# Patient Record
Sex: MALE | Race: WHITE | URBAN - NONMETROPOLITAN AREA
[De-identification: names, ages, dates, MRNs, and addresses within clinical notes are randomized per-mention and may not be internally consistent; named-entity substitution may affect disease eponyms.]

---

## 2018-05-23 ENCOUNTER — HOSPITAL ENCOUNTER (INPATIENT)
Facility: HOSPITAL | Age: 23
LOS: 6 days | Discharge: HOME | End: 2018-05-29
Attending: PSYCHIATRY & NEUROLOGY | Admitting: PSYCHIATRY & NEUROLOGY
Payer: MEDICAID

## 2018-05-23 ENCOUNTER — TRANSFERRED RECORDS (OUTPATIENT)
Dept: HEALTH INFORMATION MANAGEMENT | Facility: CLINIC | Age: 23
End: 2018-05-23

## 2018-05-23 PROBLEM — F29 PSYCHOSIS (H): Status: ACTIVE | Noted: 2018-05-23

## 2018-05-23 LAB
ALT SERPL-CCNC: 10 IU/L (ref 6–40)
AST SERPL-CCNC: 13 IU/L (ref 10–40)
CREAT SERPL-MCNC: 1.1 MG/DL (ref 0.7–1.2)
GLUCOSE SERPL-MCNC: 93 MG/DL (ref 70–100)
POTASSIUM SERPL-SCNC: 3.2 MMOL/L (ref 3.4–5.1)

## 2018-05-23 PROCEDURE — 12400000 ZZH R&B MH

## 2018-05-23 RX ORDER — OLANZAPINE 10 MG/1
10 TABLET ORAL 3 TIMES DAILY PRN
Status: DISCONTINUED | OUTPATIENT
Start: 2018-05-23 | End: 2018-05-29 | Stop reason: HOSPADM

## 2018-05-23 RX ORDER — OLANZAPINE 10 MG/2ML
10 INJECTION, POWDER, FOR SOLUTION INTRAMUSCULAR 3 TIMES DAILY PRN
Status: DISCONTINUED | OUTPATIENT
Start: 2018-05-23 | End: 2018-05-29 | Stop reason: HOSPADM

## 2018-05-23 RX ORDER — ACETAMINOPHEN 325 MG/1
650 TABLET ORAL EVERY 6 HOURS PRN
Status: DISCONTINUED | OUTPATIENT
Start: 2018-05-23 | End: 2018-05-29 | Stop reason: HOSPADM

## 2018-05-23 RX ORDER — ALUMINA, MAGNESIA, AND SIMETHICONE 2400; 2400; 240 MG/30ML; MG/30ML; MG/30ML
30 SUSPENSION ORAL 4 TIMES DAILY PRN
Status: DISCONTINUED | OUTPATIENT
Start: 2018-05-23 | End: 2018-05-29 | Stop reason: HOSPADM

## 2018-05-23 RX ORDER — HYDROXYZINE HYDROCHLORIDE 25 MG/1
25-50 TABLET, FILM COATED ORAL EVERY 4 HOURS PRN
Status: DISCONTINUED | OUTPATIENT
Start: 2018-05-23 | End: 2018-05-29 | Stop reason: HOSPADM

## 2018-05-23 ASSESSMENT — ACTIVITIES OF DAILY LIVING (ADL)
RETIRED_COMMUNICATION: 0-->UNDERSTANDS/COMMUNICATES WITHOUT DIFFICULTY
SWALLOWING: 0-->SWALLOWS FOODS/LIQUIDS WITHOUT DIFFICULTY
DRESS: SCRUBS (BEHAVIORAL HEALTH);INDEPENDENT
ORAL_HYGIENE: INDEPENDENT
BATHING: 0-->INDEPENDENT
DRESS: 0-->INDEPENDENT
LAUNDRY: UNABLE TO COMPLETE
GROOMING: INDEPENDENT
RETIRED_EATING: 0-->INDEPENDENT
TRANSFERRING: 0-->INDEPENDENT
COGNITION: 0 - NO COGNITION ISSUES REPORTED
FALL_HISTORY_WITHIN_LAST_SIX_MONTHS: NO
AMBULATION: 0-->INDEPENDENT
TOILETING: 0-->INDEPENDENT

## 2018-05-23 NOTE — IP AVS SNAPSHOT
MRN:[id number]                      After Visit Summary   5/23/2018    Miguel Velazquez    MRN: [id number]           Thank you!     Thank you for choosing [facility] for your care. Our goal is always to provide you with excellent care. Hearing back from our patients is one way we can continue to improve our services. Please take a few minutes to complete the written survey that you may receive in the mail after you visit with us. Thank you!        Patient Information     Date Of Birth          1995        Designated Caregiver       Most Recent Value    Caregiver    Will someone help with your care after discharge? no      About your hospital stay     You were admitted on:  May 23, 2018 You last received care in the:  [facility]    You were discharged on:  May 29, 2018       Who to Call     For medical emergencies, please call 911.  For non-urgent questions about your medical care, please call your primary care provider or clinic, None          Attending Provider     Provider Specialty    Franklin Arteaga MD Psychiatry    Nancie Luke NP Nurse Practitioner       Primary Care Provider Fax #    Physician No Ref-Primary [phone]      Further instructions from your care team       Behavioral Discharge Planning and Instructions    Summary: Patient was admitted with auditory hallucinations telling him to shoot himself in the mouth, head, or heart.     Main Diagnosis:  Bipolar disorder, current episode manic with psychotic features, R/o substance-induced psychosis, R/o schizoaffective disorder- bipolar type    Major Treatments, Procedures and Findings: Stabilize with medications, connect with community programs.    Symptoms to Report: feeling more aggressive, increased confusion, losing more sleep, mood getting worse or thoughts of suicide    Lifestyle Adjustment: Take all medications as prescribed, meet with doctor/ medication provider, out patient therapist, , and [facility]  worker as scheduled. Abstain from alcohol or any unprescribed drugs.    Psychiatry Follow-up:     [facility]  [address] [place] [address]  Phone - [phone]   Fax - [phone]      [facility]  [address]  [place], [id number]  [phone]  Fax [phone]    [facility]  [address]  [place]  [address]  Phone - [phone]  Fax - [phone]    [facility]   [address] [place]  [address]  Phone - [phone]  Fax - [phone]      Resources:   Crisis Intervention: 501.657.7882 or 208-212-1720 (TTY: 981.873.7606).  Call anytime for help.  National [place] on Mental Illness (www.[place].jessica.org): 451.821.7732 or 341-087-5832.  Suicide Awareness Voices of Education (SAVE) (www.save.org): 241-389-AKKC (1241)  National Suicide Prevention Line (www.mentalhealthmn.org): 194-921-DRHE (8925)  Mental Health Consumer/Survivor Network of [place] (www.mhcsn.net): 194.219.7474 or 490-141-1559  Mental Health Association of [place] (www.mentalhealth.org): 883.253.7156 or 772-627-3459    General Medication Instructions:   See your medication sheet(s) for instructions.   Take all medicines as directed.  Make no changes unless your doctor suggests them.   Go to all your doctor visits.  Be sure to have all your required lab tests. This way, your medicines can be refilled on time.  Do not use any drugs not prescribed by your doctor.  Avoid alcohol.    [place]:  [facility], Crisis stabilization housing- 879.959.8107  [facility] Crisis Line: 8-199-353-8991  Advocates For Family Peace: 766-3998  Sexual Assault Program [facility]: 156.463.9057 or 1-105.610.2515  [place] Battered Women's Program: 1-000-602-3069 Ext: 279       Calls answered Mon-Fri-8:00 am--4:30 pm    [place]:  Advocates for Family Peace: 1-161.234.7672  [facility] first call for help: 1-182.994.7903  [facility] Crisis Center:  (817) 728-8243      [place] Area:  Warm Line:  "6-792-104-7023       Calls answered Tuesday--Saturday 4:00 pm--10:00 pm  [facility] Crisis Line - 452.587.7020  [facility] 906-598-9088    [place] Statewide:  [place] Crisis and Referral Services: 1-284.814.9213  National Suicide Prevention Lifeline: 2-205-502-TALK (0553)   - vjg6fnro- Text  Life  to 250[address]  First Call for Help:   [facility] Helpline- 9-579-BZZG-HELP      Pending Results     No orders found from 2018 to 2018.            Statement of Approval     Ordered          18 1408  I have reviewed and agree with all the recommendations and orders detailed in this document.  EFFECTIVE NOW     Approved and electronically signed by:  Nancie Luke NP             Admission Information     Date & Time Provider Department Dept. Phone    2018 Nancie Luke NP [facility] [phone]      Your Vitals Were     Blood Pressure Pulse Temperature Respirations Height Weight    126/84 79 96.9  F (36.1  C) (Tympanic) 16 1.727 m (5' 8\") 88.3 kg (194 lb 11.2 oz)    Pulse Oximetry BMI (Body Mass Index)                100% 29.6 kg/m2          [employer] Information     [employer] lets you send messages to your doctor, view your test results, renew your prescriptions, schedule appointments and more. To sign up, go to [email or web address] . Click on \"Log in\" on the left side of the screen, which will take you to the Welcome page. Then click on \"Sign up Now\" on the right side of the page.     You will be asked to enter the access code listed below, as well as some personal information. Please follow the directions to create your username and password.     Your access code is: [email or web address]  Expires: 2018  2:13 PM     Your access code will  in 90 days. If you need help or a new code, please call your [facility] or [phone].        Care EveryWhere ID     This is your Care EveryWhere ID. This could be used by other organizations to access your [facility] medical " records  [id number]        Equal Access to Services     [facility] : Hadii aad ku haddeclansindy Gresham, warosa iselada zandra, qajose antoniomayra herrera, bhavin leon. So [facility] [phone].    ATENCIÓN: Si habla español, tiene a de santiago disposición servicios gratuitos de asistencia lingüística. Llame al [phone].    We comply with applicable federal civil rights laws and [place] laws. We do not discriminate on the basis of race, color, national origin, age, disability, sex, sexual orientation, or gender identity.               Review of your medicines      Notice     You have not been prescribed any medications.             Protect others around you: Learn how to safely use, store and throw away your medicines at www.disposemymeds.org.             Medication List: This is a list of all your medications and when to take them. Check marks below indicate your daily home schedule. Keep this list as a reference.      Notice     You have not been prescribed any medications.

## 2018-05-23 NOTE — PROGRESS NOTES
05/23/18 1726   Patient Belongings   Did you bring any home meds/supplements to the hospital?  No   Patient Belongings cell phone/electronics;clothing;shoes   Disposition of Belongings Sent to security per site process;Other (see comment)   Belongings Search Yes   Clothing Search Yes   Second Staff Stefan   General Info Comment 1 pair of black pajama pants, 1 black t-shirt, 1 pair of brown slippers all put in pt. cubby in pt belongings room. 1 black cell phone (NO cracks in the screen) sent to the safe.     List items sent to safe: 1 black cell phone (no cracks in the screen) sent to the safe.   All other belongings put in assigned cubby in belongings room.     I have reviewed my belongings list on admission and verify that it is correct.     Patient signature_______________________________    Second staff witness (if patient unable to sign) ______________________________       I have received all my belongings at discharge.    Patient signature________________________________    Hermila   5/23/2018  5:28 PM

## 2018-05-23 NOTE — IP AVS SNAPSHOT
[facility]    [address] [place] [address]    Phone:  [phone]    Fax:  [phone]                                       After Visit Summary   5/23/2018    Miguel Velazquez    MRN: [id number]           After Visit Summary Signature Page     I have received my discharge instructions, and my questions have been answered. I have discussed any challenges I see with this plan with the nurse or doctor.    ..........................................................................................................................................  Patient/Patient Representative Signature      ..........................................................................................................................................  Patient Representative Print Name and Relationship to Patient    ..................................................               ................................................  Date                                            Time    ..........................................................................................................................................  Reviewed by Signature/Title    ...................................................              ..............................................  Date                                                            Time

## 2018-05-23 NOTE — PLAN OF CARE
"ADMISSION NOTE    Reason for admission Psychosis command hallucinations to kill self.  Safety concerns No.  Risk for or history of violence NO.   Full skin assessment: yes and no issues    Patient arrived on unit from [facility] accompanied by Security company on 5/23/2018  4335.   Status on arrival: Calm and cooperative.  Pt somewhat sedated from medication given in ER before transport  /75  Pulse 94  Temp 98.7  F (37.1  C) (Tympanic)  Resp 16  Ht 1.727 m (5' 8\")  Wt 84.1 kg (185 lb 4.8 oz)  SpO2 95%  BMI 28.17 kg/m2  Patient given tour of unit and Welcome to  unit papers given to patient, wanding completed, belongings inventoried, and admission assessment completed.   Patient's legal status on arrival is 72 hour hold. Appropriate legal rights discussed with and copy given to patient. Patient Bill of Rights discussed with and copy given to patient.   Patient denies SI, HI, and thoughts of self harm and contracts for safety while on unit.      Bello Whitlock  5/23/2018  1544          "
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
## 2018-05-24 PROCEDURE — 99223 1ST HOSP IP/OBS HIGH 75: CPT | Performed by: NURSE PRACTITIONER

## 2018-05-24 PROCEDURE — 12400000 ZZH R&B MH

## 2018-05-24 PROCEDURE — 25000132 ZZH RX MED GY IP 250 OP 250 PS 637: Performed by: NURSE PRACTITIONER

## 2018-05-24 RX ADMIN — OLANZAPINE 10 MG: 10 TABLET, FILM COATED ORAL at 20:27

## 2018-05-24 RX ADMIN — HYDROXYZINE HYDROCHLORIDE 50 MG: 25 TABLET ORAL at 04:38

## 2018-05-24 ASSESSMENT — ACTIVITIES OF DAILY LIVING (ADL)
GROOMING: INDEPENDENT
DRESS: SCRUBS (BEHAVIORAL HEALTH);INDEPENDENT
ORAL_HYGIENE: INDEPENDENT
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
## 2018-05-24 NOTE — PLAN OF CARE
Problem: Patient Care Overview  Goal: Team Discussion  Team Plan:   BEHAVIORAL TEAM DISCUSSION    Participants: Alexandria Cantor NP,  Nancie Luke NP, Chichi Conte NP,  Breonna West LSW,  Whit Duncan LSW, Diandra Whaley RN, Rosalva Valdes RN, Otilia Kraus [facility], [facility] OT, Alexandria Jamison OT  Progress: New patient  Continued Stay Criteria/Rationale: psychosis, SI, auditory hallucinations.   Medical/Physical: none known  Precautions:   Behavioral Orders   Procedures     Code 1 - Restrict to Unit     Routine Programming     As clinically indicated     Self Injury Precaution     Status 15     Every 15 minutes.     Plan: Provider to assess.   Rationale for change in precautions or plan: none

## 2018-05-24 NOTE — PLAN OF CARE
"Problem: Patient Care Overview  Goal: Individualization & Mutuality  Pt will attend 50% of available programming daily when able to  Pt will complete ADL's  Pt will cooperate with treatment team recommendations  Pt will take medications as prescribed  Patient will sleep 6 - 8 hours a noc.  5/24/18- Pt may wean when other patients are in group. Pt not to attend groups at this time due to rambling paranoid and delusional statements. To be reassessed daily by treatment team.   Outcome: No Change  Pt denies SI, HI. Denies hallucinations at this time though does appear responding. Often laughs inappropriately during conversation. Does endorse both anxiety and depression, does not rate. When asked about pain, pt c/o neck pain and \"a bump, I think I need my prostate checked. I thought it was a skin tag but now I'm not sure and I need my prostate checked.\" Denies need for PRN at this time. States that he just wants to sleep. Speech is rambling and tangential. Pt has been in bed sleeping for most of shift. No scheduled meds.     1340-Pt asking what the plan is for him. Pt was informed that he is on a hold until Tuesday. Pt was encouraged to try a medication to help him stabilize but he declines. Pt laughs inappropriately. When asked if he needs anything, he asks this writer if he can have his phone \"to check my stocks.\" Pt was informed that this is not allowed.    Problem: Thought Process Alteration (Adult)  Goal: Improved Thought Process  Patient will have a reduction in auditory hallucinations by time of discharge   Outcome: No Change  Pt continues to appear responding to internal stimuli though he denies hallucinations.       "

## 2018-05-24 NOTE — PLAN OF CARE
Face to face end of shift report received from Paris FREEMAN. Rounding completed. Patient observed in his room and introduced to nursing for the shift.    Bello Whitlock  5/24/2018  3:44 PM
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
## 2018-05-24 NOTE — PLAN OF CARE
Face to face end of shift report received from LYDIA Underwood. Rounding completed. Patient observed in bed in MHICU.     Paris Gonzalesjose  5/24/2018  7:32 AM

## 2018-05-25 PROCEDURE — 25000132 ZZH RX MED GY IP 250 OP 250 PS 637: Performed by: NURSE PRACTITIONER

## 2018-05-25 PROCEDURE — 12400000 ZZH R&B MH

## 2018-05-25 RX ADMIN — ACETAMINOPHEN 650 MG: 325 TABLET ORAL at 16:02

## 2018-05-25 ASSESSMENT — ACTIVITIES OF DAILY LIVING (ADL)
DRESS: SCRUBS (BEHAVIORAL HEALTH);INDEPENDENT
GROOMING: INDEPENDENT
ORAL_HYGIENE: INDEPENDENT
GROOMING: INDEPENDENT
LAUNDRY: UNABLE TO COMPLETE
DRESS: SCRUBS (BEHAVIORAL HEALTH);INDEPENDENT
ORAL_HYGIENE: INDEPENDENT

## 2018-05-25 NOTE — PLAN OF CARE
Face to face end of shift report received from LYDIA Monsalve. Rounding completed. Patient observed resting in bed.     LAITH OLVERA  5/25/2018  12:28 AM

## 2018-05-25 NOTE — PLAN OF CARE
Face to face end of shift report received from Paris FREEMAN. Rounding completed. Patient observed in his room.    Bello Whitlock  5/25/2018  3:43 PM

## 2018-05-25 NOTE — PLAN OF CARE
"Problem: Patient Care Overview  Goal: Discharge Needs Assessment  Outcome: No Change  Spoke with pt this morning while he was walking the halls- He states he still isn't sure what is going on. Says he attended 2 groups this morning, but is \"shy\" so he doesn't participate. Listened to pt ask a female peer if she was his mother, then asked if she was his grandmother. He then states \"Oh, I know what's going on here.\" Then asks \"Are you someone from [employer].\" When female peer states she isn't any of those people he states \"Oh, well I don't really follow Evan.\"       "

## 2018-05-25 NOTE — PLAN OF CARE
Problem: Patient Care Overview  Goal: Individualization & Mutuality  Pt will attend 50% of available programming daily when able to  Pt will complete ADL's  Pt will cooperate with treatment team recommendations  Pt will take medications as prescribed  Patient will sleep 6 - 8 hours a noc.  5/24/18- Pt may wean when other patients are in group. Pt not to attend groups at this time due to rambling paranoid and delusional statements. To be reassessed daily by treatment team.    Pt appears to be sleeping in bed with eyes closed, having  regular respirations and position changes. 15 minutes and PRN safety checks completed with no noted complains with exception of getting up once and request a snack. Will continue to monitor.       Problem: Thought Process Alteration (Adult)  Goal: Improved Thought Process  Patient will have a reduction in auditory hallucinations by time of discharge   Pt appears to be sleeping in bed with eyes closed, having  regular respirations and position changes. 15 minutes and PRN safety checks completed with no noted complains with exception of getting up once and request a snack. Will continue to monitor.

## 2018-05-25 NOTE — DISCHARGE INSTRUCTIONS
Behavioral Discharge Planning and Instructions    Summary: Patient was admitted with auditory hallucinations telling him to shoot himself in the mouth, head, or heart.     Main Diagnosis:  Bipolar disorder, current episode manic with psychotic features, R/o substance-induced psychosis, R/o schizoaffective disorder- bipolar type    Major Treatments, Procedures and Findings: Stabilize with medications, connect with community programs.    Symptoms to Report: feeling more aggressive, increased confusion, losing more sleep, mood getting worse or thoughts of suicide    Lifestyle Adjustment: Take all medications as prescribed, meet with doctor/ medication provider, out patient therapist, , and [facility] worker as scheduled. Abstain from alcohol or any unprescribed drugs.    Psychiatry Follow-up:     [facility]  [address] [place] [address]  Phone - [phone]   Fax - [phone]      [facility]  [address] [place], [place]  [phone]  Fax [phone]    [facility]  [address] [place]  [address]  Phone - [phone]  Fax - [phone]    [facility]   [address] [place]  [address]  Phone - [phone]  Fax - [phone]      Resources:   Crisis Intervention: 437.871.3149 or 245-066-0077 (TTY: 994.896.6242).  Call anytime for help.  National [place] on Mental Illness (www.[place].jessica.org): 566.141.3629 or 514-755-3296.  Suicide Awareness Voices of Education (SAVE) (www.save.org): 560-587-DYSZ (2287)  National Suicide Prevention Line (www.mentalhealthmn.org): 994-539-VZES (0789)  Mental Health Consumer/Survivor Network of [place] (www.mhcsn.net): 237.434.8370 or 874-856-1198  Mental Health Association of [place] (www.mentalhealth.org): 677.493.8605 or 103-643-7739    General Medication Instructions:   See your medication sheet(s) for instructions.   Take all medicines as directed.  Make no changes unless your doctor suggests them.   Go to all your doctor  visits.  Be sure to have all your required lab tests. This way, your medicines can be refilled on time.  Do not use any drugs not prescribed by your doctor.  Avoid alcohol.    [place] Area:  [facility], Crisis stabilization [facility]- [phone]  [facility] Crisis Line: 1-222.879.8044  [facility]ates For Family Peace: 818-1058  Sexual Assault Program of [facility]: 717.386.6522 or 1-926.775.7501  [place] Battered Women's Program: 1-215.572.3791 Ext: 279       Calls answered Mon-Fri-8:00 am--4:30 pm    [place]:  [facility] for Family Peace: 1-901.966.6106  [facility] first call for help: 1-114.910.8311  [facility]:  (127) 200-3270      [place] Area:  Warm Line: 1-359.535.8971       Calls answered Tuesday--Saturday 4:00 pm--10:00 pm  [facility] Crisis Line - [phone]  [place] Crisis Stabilization 506-431-7083    [place] Statewide:  [place] Crisis and Referral Services: 1-632.610.4442  National Suicide Prevention Lifeline: 7-290-070-TALK (5951)   - srg9akzn- Text  Life  to [address]  First Call for Help: 2-1-1  [facility] Helpline- 8-825-CSFH-HELP

## 2018-05-25 NOTE — PLAN OF CARE
Problem: Patient Care Overview  Goal: Individualization & Mutuality  Pt will attend 50% of available programming daily when able to  Pt will complete ADL's  Pt will cooperate with treatment team recommendations  Pt will take medications as prescribed  Patient will sleep 6 - 8 hours a noc.  5/24/18- Pt may wean as appropriate. To be reassessed daily by treatment team.     Outcome: No Change  Pt denies SI, HI, hallucinations. Denies anxiety and depression. Offers no c/o pain. Pt remains easily distractable and his speech is tangential and rambling. He displays flight of ideas. He is pleasant and cooperative with nursing assessment. States that he slept well but is unsure if it is because of the PRN zyprexa he had last night. Pt has no scheduled meds this shift. He showered this shift. Continues to laugh hysterically intermittently throughout assessment.    1030- Pt weaning out to group. Pt noted to be following a female peer around the unit. He maintains appropriate boundaries and is reminded to continue to maintain proper boundaries.     1230- Pt ate lunch in the lounge and then returned to the MHICU with no issue. Currently in the MHICU lounge watching TV.    Problem: Thought Process Alteration (Adult)  Goal: Improved Thought Process  Patient will have a reduction in auditory hallucinations by time of discharge   Outcome: No Change  Pt denies hallucinations. He remains easily distractable.

## 2018-05-25 NOTE — PLAN OF CARE
Problem: Patient Care Overview  Goal: Team Discussion  Team Plan:   BEHAVIORAL TEAM DISCUSSION    Participants:  Alexandria Cantor NP, Nancie Luke NP, Chichi Conte NP, Breonna West LSW, Whit Duncan LSW, Marysol Condon RN, Danelle Austin OT, Alexandria Jamison OT, Jessica Mckeon OTS  Progress: minimal, paranoid  Continued Stay Criteria/Rationale: possibly schedule Zyprexa, paranoid  Medical/Physical: None known  Precautions:   Behavioral Orders   Procedures     Code 1 - Restrict to Unit     Routine Programming     As clinically indicated     Self Injury Precaution     Status 15     Every 15 minutes.     Plan: DC back home with resources when hold is up  Rationale for change in precautions or plan: None

## 2018-05-25 NOTE — PLAN OF CARE
Face to face end of shift report received from LYDIA Coello. Rounding completed. Patient observed in [facility].     Parisnadira Condon  5/25/2018  7:45 AM

## 2018-05-26 PROCEDURE — 99233 SBSQ HOSP IP/OBS HIGH 50: CPT | Performed by: NURSE PRACTITIONER

## 2018-05-26 PROCEDURE — 25000132 ZZH RX MED GY IP 250 OP 250 PS 637: Performed by: NURSE PRACTITIONER

## 2018-05-26 PROCEDURE — 12400000 ZZH R&B MH

## 2018-05-26 RX ADMIN — ACETAMINOPHEN 650 MG: 325 TABLET ORAL at 12:56

## 2018-05-26 ASSESSMENT — ACTIVITIES OF DAILY LIVING (ADL)
DRESS: SCRUBS (BEHAVIORAL HEALTH)
LAUNDRY: UNABLE TO COMPLETE
ORAL_HYGIENE: INDEPENDENT
GROOMING: INDEPENDENT

## 2018-05-26 NOTE — PROGRESS NOTES
"[facility]  Psychiatric Progress Note      Impression:   This is a 22 year old yo male with no documented psychiatric history though presenting with symptoms that may indicate bipolar disorder.    Miguel is sitting in the lounge when I speak to him today. He questions why he is still in the hospital. Tried to discuss the symptoms that he has been exhibiting and how concerned friends were about his behavior though he just states \"oh, okay\" and changes the topic and starts talking about something else. He has been attending some of the groups during the day. He tells me that he slept well last night. Denies feeling depressed, denies hallucinations. Mood remains elevated. He does not speak as openly about his delusions with me today, though this could be because he chose to meet with me in the [facility] area. Yesterday he had been talking about a book that his mother is writing about him \"because I am so awesome\" and had talked about celebrities being on the unit. We again discussed the benefits of mood stabilizers though he states that he likes the way he feels and \"my cup is overflowing and I don't want to put a lid on it if you know what I mean\". There is currently no danger factor to his presentation and no criteria for commitment to be filed. Will likely discharge home with outpatient MH services after his hold expires this week.    Educated regarding medication indications, risks, benefits, side effects, contraindications and possible interactions. Verbally expressed understanding.        DIagnoses:   Bipolar disorder, current episode manic with psychotic features  R/o substance-induced psychosis  R/o schizoaffective disorder- bipolar type      Attestation:  Patient has been seen and evaluated by me,  Nancie Luke NP          Interim History:   The patient's care was discussed with the treatment team and chart notes were reviewed.          Medications:     Current Facility-Administered Medications " "Ordered in Epic   Medication Dose Route Frequency Last Rate Last Dose     acetaminophen (TYLENOL) tablet 650 mg  650 mg Oral Q6H PRN   650 mg at 05/26/18 1256     alum & mag hydroxide-simethicone (MYLANTA ES/MAALOX  ES) suspension 30 mL  30 mL Oral 4x Daily PRN         hydrOXYzine (ATARAX) tablet 25-50 mg  25-50 mg Oral Q4H PRN   50 mg at 05/24/18 0438     magnesium hydroxide (MILK OF MAGNESIA) suspension 30 mL  30 mL Oral Daily PRN         nicotine polacrilex (NICORETTE) gum 2 mg  2 mg Buccal Q1H PRN         OLANZapine (zyPREXA) tablet 10 mg  10 mg Oral TID PRN   10 mg at 05/24/18 2027    Or     OLANZapine (zyPREXA) injection 10 mg  10 mg Intramuscular TID PRN         No current [facility]-ordered outpatient prescriptions on file.          10 point ROS reviewed- no current concerns       Allergies:     Allergies   Allergen Reactions     Augmentin             Psychiatric Examination:   /91  Pulse 87  Temp 97  F (36.1  C) (Tympanic)  Resp 12  Ht 1.727 m (5' 8\")  Wt 84.1 kg (185 lb 4.8 oz)  SpO2 100%  BMI 28.17 kg/m2  Weight is 185 lbs 4.8 oz  Body mass index is 28.17 kg/(m^2).    Appearance:  awake, alert, adequately groomed, dressed in hospital scrubs and appeared as age stated  Attitude:  cooperative  Eye Contact:  good  Mood: elevated  Affect:  mood congruent  Speech:  clear, coherent, mildly pressured  Psychomotor Behavior:  no evidence of tardive dyskinesia, dystonia, or tics  Thought Process:  illogical and tangental  Associations:  no loose associations  Thought Content:  no evidence of suicidal ideation or homicidal ideation, denies hallucinations  Insight:  limited  Judgment:  limited  Oriented to:  time, person, and place  Attention Span and Concentration:  fair  Recent and Remote Memory:  fair  Fund of Knowledge: appropriate for education  Muscle Strength and Tone: normal  Gait and Station: Normal           Labs:   No results found for this or any previous visit (from the past 24 hour(s)).           " Plan:   He continues to decline medications  Would benefit from mood stabilizer    ELOS: 2-3 more days to monitor for stability, ongoing psychosis

## 2018-05-26 NOTE — PLAN OF CARE
Face to face end of shift report received from Julissa FREEMAN. Rounding completed. Patient observed in group.    Bello Whitlock  5/26/2018  3:58 PM

## 2018-05-26 NOTE — PLAN OF CARE
Face to face end of shift report received from Radha ROSENBAUM RN. Rounding completed. Patient observed.     Julissa Sam  5/26/2018  7:50 AM

## 2018-05-26 NOTE — PLAN OF CARE
"Problem: Patient Care Overview  Goal: Individualization & Mutuality  Pt will attend 50% of available programming daily when able to  Pt will complete ADL's  Pt will cooperate with treatment team recommendations  Pt will take medications as prescribed  Patient will sleep 6 - 8 hours a noc.  5/24/18- Pt may wean as appropriate. To be reassessed daily by treatment team.     Outcome: Improving  Patient denies SI, HI, dias., pain, depression and anxiety. He states that he is \"hurt\" that this writer is asking him these questions before he is able to wean. He also agrees to allow [facility], he is initially loudly and inappropriately laughing with peers. This writer was going to go and talk to him about this, but he calmed on his own without intervention. He has been appropriate since. He showered this morning. He has been pleasant and was cooperative with assessments. He continues to be distractible with rambling and tangential speech.  1300: Patient requested, and received, PRN tylenol for right shoulder/neck pain  1400: Patient states that his pain has gone away and PRN was effective. Patient's room moved to open unit. His behaviors have been appropriate this shift.    Problem: Thought Process Alteration (Adult)  Goal: Improved Thought Process  Patient will have a reduction in auditory hallucinations by time of discharge   Outcome: Therapy, progress toward functional goals is gradual  Patient denies dias.       "

## 2018-05-26 NOTE — PLAN OF CARE
Face to face end of shift report received from Bello Walker completed. Patient observed sleeping in prone position with regular and unlabored respirations.     Radha Kate  5/26/2018  12:30 AM

## 2018-05-26 NOTE — PLAN OF CARE
Problem: Patient Care Overview  Goal: Individualization & Mutuality  Pt will attend 50% of available programming daily when able to  Pt will complete ADL's  Pt will cooperate with treatment team recommendations  Pt will take medications as prescribed  Patient will sleep 6 - 8 hours a noc.  5/24/18- Pt may wean as appropriate. To be reassessed daily by treatment team.     Outcome: Improving  Pt weaned to the dayroom and groups all evening.  He is social and appropriate with his interactions with peers though continues to have tangential thinking with some delusional content.  He continues to feel that he is here and is part of a movie or TV show. He said that he slept well last night but does not think it is due to taking PRN Zyprexa.  He said that he feels great now and does not know why he is here except to be in the movie. He smiles and laughs loudly often and does tend to ramble and monopolize group. His speech is somewhat pressured.  He does not think that he needs medications. He appears more hypomanic today when compared to yesterday.  Pt denied hearing voices and feels his thinking is just fine.    Problem: Thought Process Alteration (Adult)  Goal: Improved Thought Process  Patient will have a reduction in auditory hallucinations by time of discharge   Outcome: Improving  Pt denied having auditory hallucinations though continues to be delusional

## 2018-05-26 NOTE — PLAN OF CARE
Problem: Patient Care Overview  Goal: Individualization & Mutuality  Pt will attend 50% of available programming daily when able to  Pt will complete ADL's  Pt will cooperate with treatment team recommendations  Pt will take medications as prescribed  Patient will sleep 6 - 8 hours a noc.  5/24/18- Pt may wean as appropriate. To be reassessed daily by treatment team.     Pt has been in bed with eyes closed and regular respirations. 15 minute and PRN checks all night. No complaints offered. Will continue to monitor.      Radha Kate  5/26/2018  12:45 AM

## 2018-05-27 PROCEDURE — 25000132 ZZH RX MED GY IP 250 OP 250 PS 637: Performed by: NURSE PRACTITIONER

## 2018-05-27 PROCEDURE — 12400000 ZZH R&B MH

## 2018-05-27 RX ADMIN — OLANZAPINE 10 MG: 10 TABLET, FILM COATED ORAL at 12:49

## 2018-05-27 ASSESSMENT — ACTIVITIES OF DAILY LIVING (ADL)
ORAL_HYGIENE: INDEPENDENT
LAUNDRY: UNABLE TO COMPLETE
GROOMING: INDEPENDENT
DRESS: INDEPENDENT;SCRUBS (BEHAVIORAL HEALTH)
GROOMING: INDEPENDENT
ORAL_HYGIENE: INDEPENDENT
LAUNDRY: UNABLE TO COMPLETE
DRESS: SCRUBS (BEHAVIORAL HEALTH)

## 2018-05-27 NOTE — PLAN OF CARE
Problem: Patient Care Overview  Goal: Individualization & Mutuality  Pt will attend 50% of available programming daily when able to  Pt will complete ADL's  Pt will cooperate with treatment team recommendations  Pt will take medications as prescribed  Patient will sleep 6 - 8 hours a noc.   Pt has been in bed with eyes closed and regular respirations. 15 minute and PRN checks all night. No complaints offered. Will continue to monitor.      Radha Kate  5/26/2018  11:41 PM

## 2018-05-27 NOTE — PLAN OF CARE
Face to face end of shift report received from Bello Walker completed. Patient observed sleeping in prone position with regular and unlabored respirations.     Radha Kate  5/26/2018  11:40 PM

## 2018-05-27 NOTE — PLAN OF CARE
Problem: Patient Care Overview  Goal: Individualization & Mutuality  Pt will attend 50% of available programming daily when able to  Pt will complete ADL's  Pt will cooperate with treatment team recommendations  Pt will take medications as prescribed  Patient will sleep 6 - 8 hours a noc.   Outcome: Improving  Pt has attended all of the available groups this evening. He did not have tangential thinking this evening though he does have rapid pressured speech. He visited with a friend this evening and reported that it went well.  He is very social and reports that his mood is great. He hopes that he will be discharged soon but says that he is having a good time here.    Problem: Thought Process Alteration (Adult)  Goal: Improved Thought Process  Patient will have a reduction in auditory hallucinations by time of discharge   Outcome: Improving  Pt denies hallucinations and does not seem as delusional

## 2018-05-27 NOTE — PLAN OF CARE
"Problem: Patient Care Overview  Goal: Individualization & Mutuality  Pt will attend 50% of available programming daily when able to  Pt will complete ADL's  Pt will cooperate with treatment team recommendations  Pt will take medications as prescribed  Patient will sleep 6 - 8 hours a noc.   Outcome: No Change  Patient denies SI, HI, dias., depression and anxiety. He contracts for safety. He admits to right shoulder/neck pain but declines any medications. He states that he slept well last night \"once all of the caffeine wore off\". When this writer came on shift, patient was laughing inappropriately in the lounge area, but then stopped and behavior has been appropriate since. He does continue to have poor insight into his illness. He does not have any medications scheduled.  1115: Patient noted to be rapidly pacing in the halls with a serious look on his face. This writer approached him and asked him what was wrong. He stated \"Something bad, but I don't know if I should tell you\". This writer offered to walk with patient, \"Go ahead and walk with me, but I don't think it's going to help\". Patient then verbalizes that he feels his friends from high school are not really his friends, and they haven't been for awhile \"it's just a feeling I get. I didn't have to talk to any of them. I don't need to when I get this feeling\". He states that now this is real to him and he's realized \"some things\" since being up here. Patient continues to pace rapidly. This writer continued to walk with him and talk to him about situation, relaxation and coping skills. Patient also reminded to focus on the present and what he can do right now. Patient verbalizes understanding. Patient does begin to slow his pace and eventually states that he is feeling better. His demeanor and posture appear to be more calm. He states that he feels he is ready to go back to group and thanks this writer. Will continue to monitor.  1255: This writer was in with " "another patient when this patient was heard yelling. This writer went in to patient's room and observed other staff talking with him. Patient visibly upset stating that \"if you want to know what's wrong call my ex girlfriend. She doesn't want to be with me or have a baby with me and now I know why\". Patient tells other nurse that he has nothing more to say. Other nurse tells this writer that patient was in the lounge crying and then making paranoid and delusional statements about his ex girlfriend. Patient then ran to his room and stated that he wanted to be alone and color. This writer approached patient and offered PRN zyprexa 10 mg PO, which patient accepted. This writer offered to sit and talk with patient but he stated \"No, there's nothing to talk about ever\". Will continue to monitor.   1430: Patient in the lounge area. Mood is pleasant and calm. Patient states that he is feeling better. He states that \"I may have had trash in my life. But I took the trash out and I am feeling better\".    Problem: Thought Process Alteration (Adult)  Goal: Improved Thought Process  Patient will have a reduction in auditory hallucinations by time of discharge   Outcome: No Change  Patient denies hallucinations. He does not appear to be responding at this time.      "

## 2018-05-27 NOTE — PLAN OF CARE
Face to face end of shift report received from Julissa FREEMAN. Rounding completed. Patient observed in dayroom and introduced to nursing for the shift.    Bello Whitlock  5/27/2018  [id number]

## 2018-05-27 NOTE — PLAN OF CARE
Face to face end of shift report received from Radha ROSENBAUM RN. Rounding completed. Patient observed.     Julissa Sam  5/27/2018  7:28 AM

## 2018-05-28 PROCEDURE — 12400000 ZZH R&B MH

## 2018-05-28 PROCEDURE — 99232 SBSQ HOSP IP/OBS MODERATE 35: CPT | Performed by: NURSE PRACTITIONER

## 2018-05-28 PROCEDURE — 25000132 ZZH RX MED GY IP 250 OP 250 PS 637: Performed by: NURSE PRACTITIONER

## 2018-05-28 RX ADMIN — HYDROXYZINE HYDROCHLORIDE 25 MG: 25 TABLET ORAL at 23:25

## 2018-05-28 RX ADMIN — OLANZAPINE 10 MG: 10 TABLET, FILM COATED ORAL at 02:43

## 2018-05-28 ASSESSMENT — ACTIVITIES OF DAILY LIVING (ADL)
GROOMING: INDEPENDENT
LAUNDRY: UNABLE TO COMPLETE
GROOMING: INDEPENDENT
ORAL_HYGIENE: INDEPENDENT
ORAL_HYGIENE: INDEPENDENT
DRESS: STREET CLOTHES

## 2018-05-28 NOTE — PLAN OF CARE
Face to face end of shift report received from Bello Walker completed. Patient observed sleeping in prone position with regular and unlabored respirations.     Radha Kate  5/28/2018  12:40 AM

## 2018-05-28 NOTE — PROGRESS NOTES
[facility]  Psychiatric Progress Note      Impression:   This is a 22 year old yo male with no documented psychiatric history though presenting with symptoms that may indicate bipolar disorder.    Miguel is laying in bed when I go to see him today. He keeps his eyes closed throughout most of our conversation. He denies feeling depressed or suicidal. He denies that he is hearing voices. He does laugh oddly to himself and remains tangential. Speech is somewhat pressured. Does look preoccupied at times and mumble to himself. Has attended some of the groups today. Will likely discharge home when hold is up.    Educated regarding medication indications, risks, benefits, side effects, contraindications and possible interactions. Verbally expressed understanding.        DIagnoses:   Bipolar disorder, current episode manic with psychotic features  R/o substance-induced psychosis  R/o schizoaffective disorder- bipolar type      Attestation:  Patient has been seen and evaluated by me,  Nancie Luke NP          Interim History:   The patient's care was discussed with the treatment team and chart notes were reviewed.          Medications:     Current Facility-Administered Medications Ordered in Epic   Medication Dose Route Frequency Last Rate Last Dose     acetaminophen (TYLENOL) tablet 650 mg  650 mg Oral Q6H PRN   650 mg at 05/26/18 1256     alum & mag hydroxide-simethicone (MYLANTA ES/MAALOX  ES) suspension 30 mL  30 mL Oral 4x Daily PRN         hydrOXYzine (ATARAX) tablet 25-50 mg  25-50 mg Oral Q4H PRN   50 mg at 05/24/18 0438     magnesium hydroxide (MILK OF MAGNESIA) suspension 30 mL  30 mL Oral Daily PRN         nicotine polacrilex (NICORETTE) gum 2 mg  2 mg Buccal Q1H PRN         OLANZapine (zyPREXA) tablet 10 mg  10 mg Oral TID PRN   10 mg at 05/24/18 2027    Or     OLANZapine (zyPREXA) injection 10 mg  10 mg Intramuscular TID PRN         No current [facility]-ordered outpatient prescriptions on file.  "         10 point ROS reviewed- denies current concerns       Allergies:     Allergies   Allergen Reactions     Augmentin             Psychiatric Examination:   /78  Pulse 96  Temp 96.9  F (36.1  C) (Tympanic)  Resp 14  Ht 1.727 m (5' 8\")  Wt 88.3 kg (194 lb 11.2 oz)  SpO2 100%  BMI 29.6 kg/m2  Weight is 194 lbs 11.2 oz  Body mass index is 29.6 kg/(m^2).    Appearance: awake, alert, dressed in hospital scrubs, casually groomed  Attitude: cooperative, more dismissive today  Eye Contact: limited as he is resting in bed and keeps eyes closed  Mood: remains elevated  Affect:  mood congruent  Speech:  clear, coherent, mildly pressured  Psychomotor Behavior:  no evidence of tardive dyskinesia, dystonia, or tics  Thought Process: tangential, illogical at times  Associations:  no loose associations  Thought Content:  Denies any suicidal or homicidal ideation, denies hallucinations  Insight:  limited  Judgment:  limited  Oriented to:  time, person, and place  Attention Span and Concentration:  fair  Recent and Remote Memory:  fair  Fund of Knowledge: appropriate for education  Muscle Strength and Tone: normal  Gait and Station: Normal           Labs:   No results found for this or any previous visit (from the past 24 hour(s)).           Plan:   He continues to decline medications  Would benefit from mood stabilizer    ELOS: 1-2 more days to monitor for stability, ongoing psychosis    "

## 2018-05-28 NOTE — PLAN OF CARE
Problem: Patient Care Overview  Goal: Individualization & Mutuality  Pt will attend 50% of available programming daily when able to  Pt will complete ADL's  Pt will cooperate with treatment team recommendations  Pt will take medications as prescribed  Patient will sleep 6 - 8 hours a noc.   Outcome: Declining  Poor sleep this - well odd hours of sleep - did relay that he went to bed at 1830 - we did discuss asleep and awake hours.  Did administer zyprexa 10 mg po at 0243 - pt alseep by -[id number]

## 2018-05-28 NOTE — PLAN OF CARE
Problem: Patient Care Overview  Goal: Individualization & Mutuality  Pt will attend 50% of available programming daily when able to  Pt will complete ADL's  Pt will cooperate with treatment team recommendations  Pt will take medications as prescribed  Patient will sleep 6 - 8 hours a noc.   Outcome: No Change  Patient denies any mental health issues.  He denies hallucinations.  He does appear to be responding to internal stimuli and can be heard laughing to himself at times.  Patient's affect is full range.  No scheduled medications this shift.  Patient is withdrawn.  His speech is pressured and tangential at times.  Patient is attending groups this shift.  Will continue to monitor.      Problem: Thought Process Alteration (Adult)  Goal: Improved Thought Process  Patient will have a reduction in auditory hallucinations by time of discharge   Outcome: No Change  Patient denies hallucinations but appears to be responding to internal stimuli.

## 2018-05-28 NOTE — PLAN OF CARE
Problem: Patient Care Overview  Goal: Individualization & Mutuality  Pt will attend 50% of available programming daily when able to  Pt will complete ADL's  Pt will cooperate with treatment team recommendations  Pt will take medications as prescribed  Patient will sleep 6 - 8 hours a noc.   Outcome: Improving  Pt has been up and active all shift. He attended all of the available groups and was social with peers and staff. He played Monopoly with peers in the dayroom smiling and laughing often. He denied having any mental health issues and says that he is not hearing any voices. He did go to bed a bit early at 9.  He continues to have rapid pressured speech with some tangential thoughts.    Problem: Thought Process Alteration (Adult)  Goal: Improved Thought Process  Patient will have a reduction in auditory hallucinations by time of discharge   Outcome: Improving  Pt is able to have reality based thoughts but is still tangential

## 2018-05-28 NOTE — PLAN OF CARE
Face to face end of shift report received from Yasmine ALMODOVAR RN. Rounding completed. Patient observed.     Poppy Prado  5/28/2018  7:41 AM

## 2018-05-28 NOTE — PLAN OF CARE
Face to face end of shift report received from  Popyp FREEMAN Rounding completed. Patient observed.     Edwina Musa  5/28/2018  4:05 PM

## 2018-05-29 VITALS
WEIGHT: 194.7 LBS | SYSTOLIC BLOOD PRESSURE: 125 MMHG | RESPIRATION RATE: 16 BRPM | OXYGEN SATURATION: 98 % | DIASTOLIC BLOOD PRESSURE: 80 MMHG | TEMPERATURE: 97.4 F | HEART RATE: 79 BPM | BODY MASS INDEX: 29.51 KG/M2 | HEIGHT: 68 IN

## 2018-05-29 PROCEDURE — 99239 HOSP IP/OBS DSCHRG MGMT >30: CPT | Performed by: NURSE PRACTITIONER

## 2018-05-29 ASSESSMENT — ACTIVITIES OF DAILY LIVING (ADL)
GROOMING: INDEPENDENT
DRESS: SCRUBS (BEHAVIORAL HEALTH);INDEPENDENT
ORAL_HYGIENE: INDEPENDENT
LAUNDRY: UNABLE TO COMPLETE

## 2018-05-29 NOTE — PLAN OF CARE
Problem: Patient Care Overview  Goal: Individualization & Mutuality  Pt will attend 50% of available programming daily when able to  Pt will complete ADL's  Pt will cooperate with treatment team recommendations  Pt will take medications as prescribed  Patient will sleep 6 - 8 hours a noc.   0145 in bed with easy respirations,  Presenting sleeping.0527 patient continues to sleep at this time.    Problem: Thought Process Alteration (Adult)  Goal: Identify Related Risk Factors and Signs and Symptoms      Patient will be reality based in her thought processing.   0145 in bed with easy respirations, presenting sleeping.

## 2018-05-29 NOTE — PLAN OF CARE
Problem: Patient Care Overview  Goal: Discharge Needs Assessment  Outcome: Adequate for Discharge Date Met: 05/29/18  Pt is discharging at the recommendation of the treatment team. Pt is discharging to home transported by friends.  Pt declined follow up services- did give resources.

## 2018-05-29 NOTE — PLAN OF CARE
Face to face end of shift report received from LYDIA Mcwilliams. Rounding completed. Patient observed.     Anais Painter  5/29/2018  6:33 PM

## 2018-05-29 NOTE — PLAN OF CARE
Face to face end of shift report received from Pavithra ABARCA RN. Rounding completed. Patient observed.     Poppy Prado  5/29/2018  8:59 AM

## 2018-05-29 NOTE — PROGRESS NOTES
Face to face end of shift report received from tonny rn at 2300 report.. Rounding completed. Patient observed.     Pavithra Valencia  5/29/2018  2:29 AM

## 2018-05-29 NOTE — PLAN OF CARE
Problem: Patient Care Overview  Goal: Individualization & Mutuality  Pt will attend 50% of available programming daily when able to  Pt will complete ADL's  Pt will cooperate with treatment team recommendations  Pt will take medications as prescribed  Patient will sleep 6 - 8 hours a noc.   Outcome: No Change  Patient attending groups.  Independent with ADLs.  No scheduled medications this shift.  Able to hold reality based conversation.  Pt's friend on [facility] called stating patient is saying he will be released today.  Patient's affect is full range and mood is calm.  He is attending groups but was disruptive during a morning group.    Discharge order in for today.  Called patient's friend Herman for transportation home.  Patient will be picked up between [id number] tonight.    Problem: Thought Process Alteration (Adult)  Goal: Identify Related Risk Factors and Signs and Symptoms      Patient will be reality based in her thought processing.   Outcome: No Change  Patient's conversations are reality based.    Goal: Improved Thought Process  Patient will have a reduction in auditory hallucinations by time of discharge   Outcome: No Change  Patient denies hallucinations.

## 2018-05-29 NOTE — PLAN OF CARE
Discharge Note:  Discharged to home    Patient  on 5/29/2018 5:08 PM via Private Car accompanied by friend.     Patient informed of discharge instructions in AVS. patient verbalizes understanding and denies having any questions pertaining to AVS. Patient stable at time of discharge. Patient denies SI, HI, and thoughts of self harm at time of discharge. All personal belongings returned to patient. No discharge prescriptions. AVS given to pt with resources for aftercare highlighted.     Anais Painter  5/29/2018  5:08 PM

## 2018-05-29 NOTE — PLAN OF CARE
"Problem: Patient Care Overview  Goal: Individualization & Mutuality  Pt will attend 50% of available programming daily when able to  Pt will complete ADL's  Pt will cooperate with treatment team recommendations  Pt will take medications as prescribed  Patient will sleep 6 - 8 hours a noc.   Outcome: Improving  Pt cooperative  Denies depression SI or voices  Attends most groups  Eating okay  Sleeps \"great\"  Watching tv in lobby and appears to be interacting appropriately  Did not use any grandiose talk with writer  2053  Pt requested medication for relaxation for anxiety   Given atarax 25 mg oral  Pt states \"sensitive to meds so didn't want to much\"      "

## 2018-05-30 NOTE — DISCHARGE SUMMARY
"Psychiatric Discharge Summary    Miguel Velazquez MRN# [id number]   Age: 22 year old YOB: 1995     Date of Admission:  5/23/2018  Date of Discharge:  5/29/2018  Admitting Physician:  Franklin Arteaga MD  Discharge Physician:  Nancie Luke CNP         Event Leading to Hospitalization and Hospital Stay   Miguel Velazquez is a 22 year old single  male who was brought to the [facility] ED by a friend due to increased hallucinations and psychosis. Friend reported that something may have happened at work to \"trigger\" this. Increased paranoia for the last 3 days. He reported voices that were telling him to shoot himself in the mouth, head, or heart. He did not want to harm himself so brought himself to the ED. He reportedly had an episode similar to this a few years, though did not require hospitalization.      Miguel reports \"this week has been a roller coaster. It's a long story\". He reports that he has been trying to \"figure out what is going, my friends have been acting so weird. They have been bugging my house and listening to me. They also bugged my car.\" He then goes on to describe how he found a wire unplugged on his car stereo and his \"steering wheel wouldn't work\", which is how he was able to \"figure it all out\". 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 States that they are people from several different tv shows, notes that he does not want \"the national attention on me\" because they were in his check-out line. He then questions whether his friends put him on a reality tv show, \"I would be okay with that though, but it would depend on what show\". He reports that his friends call him \"flamboyant\". Does admit that his friends tell him that he talks too much and too fast, though states \"it is because I am eccentric\". He reports that in the last week he has slept very little. Notes that he has had to stay up to \"figure out what is going " "on, its crazy\". States that he had unplugged some electronics in his house. He reports \"I always talk to myself, its part of being eccentric\". He denies that he hears voices at this time, though does admit he was hearing voices yesterday. Denies that he was feeling suicidal, stating it was the voices telling him to hurt himself though he wouldn't act on this.   Throughout our conversation he would start laughing hysterically at inappropriate times, then apologize for his behavior. He is moderately pressured and rambling, needs redirection to remain on topic and answer questions. Tangential with flight of ideas. When we started talking about bipolar disorder symptoms he reported \"that word was thrown around a lot when I was growing up, I think that my dad was probably bipolar, but I guess I'm not sure.\" Attempted to discuss medications and mood stabilizers with him, though he tells me \"I really don't want anything interfering with my body at this point.\" He reports only illicit substance he uses is marijuana.    Hospital course: Miguel was admitted to the behavioral health unit on a 72 hour hold. This was his first psychiatric hospitalization. He reported that he had never been given a bipolar diagnosis, though his presenting symptoms were highly suspicious for bipolar disorder. Upon admission he exhibited elevated mood, pressured speech, paranoid delusions, poor sleep, and auditory hallucinations. He reported his father as likely bipolar. He adamantly denied the need for medications, though several attempts were made to educate him on the benefits of mood stabilizers. He did attend group programming throughout his stay though did require redirection at times due to his monopolizing the conversation. He did take redirection well. He did not present as a danger to himself or others so petition for commitment was not pursued. He did sleep well the last few nights he was here and paranoid thoughts were not as " pronounced. He is requesting to discharge today when his 72 hour hold expires. Will discharge him home with a list of mental health resources and contact information for crisis centers.            At time of discharge, there is no evidence that patient is in immediate danger of self or others.        DIagnoses:   Bipolar disorder, current episode manic with psychotic features  R/o schizoaffective disorder- bipolar type         Labs:     Preadmission labs reviewed and in paper chart. Utox +THC. BMP remarkable only for slight decrease in K+ 3.2 (replaced in ED). CBC, TSH are WNL. UA negative.         Discharge Medications:   There are no discharge medications for this patient.        Justification for dual anti-psychotic use: not applicable       Psychiatric Examination:   Appearance:  awake, alert, adequately groomed and appeared as age stated  Attitude:  cooperative, pleasant  Eye Contact:  good  Mood: remains slightly elevated  Affect: mood congruent  Speech:  clear, coherent though mildly pressured  Psychomotor Behavior:  no evidence of tardive dyskinesia, dystonia, or tics  Thought Process:  linear and goal oriented, brief periods of illogical thought remain  Associations:  no loose associations  Thought Content:  no evidence of suicidal ideation or homicidal ideation and no auditory hallucinations present  Insight:  limited  Judgment:  fair  Oriented to:  time, person, and place  Attention Span and Concentration:  fair  Recent and Remote Memory:  intact  Fund of Knowledge: appropriate  Muscle Strength and Tone: normal  Gait and Station: Normal       Discharge Plan:   Psychiatry Follow-up:      [facility] [[email or web address]  [address] [place] [address]  Phone - [phone]   Fax - [phone]        [facility]  [address] [place], [place]  [phone]  Fax [phone]     [facility]  [address] [place]  [address]  Phone - [phone]  Fax - [phone]     [facility]    [address]  [place]  [address]  Phone - [phone]  Fax - [phone]    Attestation:  The patient has been seen and evaluated by me,  Nancie Luke NP         Discharge Services Provided:    35 minutes spent on discharge services, including:  Final examination of patient.  Review and discussion of Hospital stay.  Instructions for continued outpatient care/goals.  Preparation of discharge records.  Preparation of medications refills and new prescriptions.

## 2020-03-11 ENCOUNTER — HEALTH MAINTENANCE LETTER (OUTPATIENT)
Age: 25
End: 2020-03-11

## 2021-01-03 ENCOUNTER — HEALTH MAINTENANCE LETTER (OUTPATIENT)
Age: 26
End: 2021-01-03

## 2021-04-25 ENCOUNTER — HEALTH MAINTENANCE LETTER (OUTPATIENT)
Age: 26
End: 2021-04-25

## 2021-10-10 ENCOUNTER — HEALTH MAINTENANCE LETTER (OUTPATIENT)
Age: 26
End: 2021-10-10

## 2022-05-21 ENCOUNTER — HEALTH MAINTENANCE LETTER (OUTPATIENT)
Age: 27
End: 2022-05-21